# Patient Record
Sex: MALE | Race: WHITE | URBAN - NONMETROPOLITAN AREA
[De-identification: names, ages, dates, MRNs, and addresses within clinical notes are randomized per-mention and may not be internally consistent; named-entity substitution may affect disease eponyms.]

---

## 2021-04-16 ENCOUNTER — IMPORTED ENCOUNTER (OUTPATIENT)
Dept: URBAN - NONMETROPOLITAN AREA CLINIC 1 | Facility: CLINIC | Age: 41
End: 2021-04-16

## 2021-04-16 PROBLEM — H52.03: Noted: 2021-04-16

## 2021-04-16 PROCEDURE — 92004 COMPRE OPH EXAM NEW PT 1/>: CPT

## 2021-04-16 PROCEDURE — 92015 DETERMINE REFRACTIVE STATE: CPT

## 2021-04-16 NOTE — PATIENT DISCUSSION
Hyperopia OU - New spectacle Rx issued - Continue to monitor; 's Notes: Esequiel Jordan in [place]

## 2022-04-10 ASSESSMENT — TONOMETRY
OS_IOP_MMHG: 18
OD_IOP_MMHG: 18

## 2022-04-10 ASSESSMENT — VISUAL ACUITY
OS_CC: 20/20
OD_CC: 20/30